# Patient Record
Sex: FEMALE | Race: ASIAN | NOT HISPANIC OR LATINO | URBAN - METROPOLITAN AREA
[De-identification: names, ages, dates, MRNs, and addresses within clinical notes are randomized per-mention and may not be internally consistent; named-entity substitution may affect disease eponyms.]

---

## 2019-11-11 ENCOUNTER — OUTPATIENT (OUTPATIENT)
Dept: OUTPATIENT SERVICES | Facility: HOSPITAL | Age: 29
LOS: 1 days | End: 2019-11-11

## 2019-11-11 VITALS
RESPIRATION RATE: 16 BRPM | HEIGHT: 61 IN | WEIGHT: 169.98 LBS | HEART RATE: 96 BPM | OXYGEN SATURATION: 98 % | SYSTOLIC BLOOD PRESSURE: 100 MMHG | DIASTOLIC BLOOD PRESSURE: 64 MMHG | TEMPERATURE: 98 F

## 2019-11-11 DIAGNOSIS — Z98.891 HISTORY OF UTERINE SCAR FROM PREVIOUS SURGERY: Chronic | ICD-10-CM

## 2019-11-11 LAB
APPEARANCE UR: CLEAR — SIGNIFICANT CHANGE UP
BILIRUB UR-MCNC: NEGATIVE — SIGNIFICANT CHANGE UP
BLD GP AB SCN SERPL QL: NEGATIVE — SIGNIFICANT CHANGE UP
BLOOD UR QL VISUAL: NEGATIVE — SIGNIFICANT CHANGE UP
COLOR SPEC: YELLOW — SIGNIFICANT CHANGE UP
GLUCOSE UR-MCNC: NEGATIVE — SIGNIFICANT CHANGE UP
HCT VFR BLD CALC: 40.4 % — SIGNIFICANT CHANGE UP (ref 34.5–45)
HGB BLD-MCNC: 13.2 G/DL — SIGNIFICANT CHANGE UP (ref 11.5–15.5)
KETONES UR-MCNC: NEGATIVE — SIGNIFICANT CHANGE UP
LEUKOCYTE ESTERASE UR-ACNC: NEGATIVE — SIGNIFICANT CHANGE UP
MCHC RBC-ENTMCNC: 28.1 PG — SIGNIFICANT CHANGE UP (ref 27–34)
MCHC RBC-ENTMCNC: 32.7 % — SIGNIFICANT CHANGE UP (ref 32–36)
MCV RBC AUTO: 86 FL — SIGNIFICANT CHANGE UP (ref 80–100)
NITRITE UR-MCNC: NEGATIVE — SIGNIFICANT CHANGE UP
NRBC # FLD: 0 K/UL — SIGNIFICANT CHANGE UP (ref 0–0)
PH UR: 6.5 — SIGNIFICANT CHANGE UP (ref 5–8)
PLATELET # BLD AUTO: 267 K/UL — SIGNIFICANT CHANGE UP (ref 150–400)
PMV BLD: 9.2 FL — SIGNIFICANT CHANGE UP (ref 7–13)
PROT UR-MCNC: NEGATIVE — SIGNIFICANT CHANGE UP
RBC # BLD: 4.7 M/UL — SIGNIFICANT CHANGE UP (ref 3.8–5.2)
RBC # FLD: 13.7 % — SIGNIFICANT CHANGE UP (ref 10.3–14.5)
RH IG SCN BLD-IMP: POSITIVE — SIGNIFICANT CHANGE UP
SP GR SPEC: 1.01 — SIGNIFICANT CHANGE UP (ref 1–1.04)
UROBILINOGEN FLD QL: NORMAL — SIGNIFICANT CHANGE UP
WBC # BLD: 11.6 K/UL — HIGH (ref 3.8–10.5)
WBC # FLD AUTO: 11.6 K/UL — HIGH (ref 3.8–10.5)

## 2019-11-11 RX ORDER — SODIUM CHLORIDE 9 MG/ML
1000 INJECTION, SOLUTION INTRAVENOUS
Refills: 0 | Status: DISCONTINUED | OUTPATIENT
Start: 2019-11-26 | End: 2019-11-26

## 2019-11-11 NOTE — OB PST NOTE - NSANTHBMIRD_ENT_A_CORE
No

## 2019-11-11 NOTE — OB PST NOTE - NSHPREVIEWOFSYSTEMS_GEN_ALL_CORE
General: No fever, chills, sweating, anorexia, . No polyphagia, polyurea, polydypsia, malaise, or fatigue    Skin: No rashes, itching, or dryness. No change in size/color of moles.     Breast: Breast tenderness in pregnancy.       Ophthalmologic: No diplopia, photophobia, lacrimation, blurred Vision , or eye discharge    ENMT Symptoms: No hearing difficulty, ear pain, tinnitus, or vertigo. No sinus symptoms, nasal congestion, nasal   discharge, or nasal obstruction    Respiratory and Thorax: No wheezing, dyspnea, cough, hemoptysis, or pleuritic chest pain     Cardiovascular: No chest pain, palpitations, dyspnea on exertion, orthopnea, paroxysmal nocturnal dyspnea,   peripheral edema, or claudication    Gastrointestinal: No nausea, vomiting, diarrhea, constipation, change in bowel habits, flatulence, abdominal pain, or melena    Genitourinary/ Pelvis: No hematuria, renal colic, or flank pain.  No urine discoloration, incontinence, dysuria, or urinary hesitancy. Normal urinary frequency. No nocturia, abnormal vaginal bleeding, vaginal discharge, spotting, pelvic pain, or vaginal leakage    Musculoskeletal: No arthralgia, arthritis, joint swelling, muscle cramping, muscle weakness, neck pain, arm pain, or leg pain    Neurological: No transient paralysis, weakness, paresthesias, or seizures. No syncope, tremors, vertigo, loss of sensation, difficulty walking, loss of consciousness, hemiparesis, confusion, or facial palsy    Psychiatric: No suicidal ideation, depression, anxiety, insomnia, memory loss, paranoia, mood swings, agitation, hallucinations, or hyperactivity    Hematology: No gum bleeding, nose bleeding, or skin lumps    Lymphatic: No enlarged or tender lymph nodes. No extremity swelling    Endocrine: No heat or cold intolerance    Immunologic: No recurrent or persistent infections

## 2019-11-11 NOTE — OB PST NOTE - NSHPPHYSICALEXAM_GEN_ALL_CORE
Constitutional: Well Developed, Well Groomed, Well Nourished, No Distress    Eyes: PERRL, EOMI, conjunctiva clear    Ears: Normal    Mouth & Gums: Normal, moist    Pharynx: No redness, discharge.    Tonsils: No Redness, discharge,  or swelling    Neck: Supple    Breast: exam declined    Back: Normal shape, ROM intact, strength intact, no vertebral tenderness    Respiratory: Airway patent, breath sounds equal, good air movement, respiration non-labored, clear to auscultation bilateral, no chest wall tenderness    Cardiovascular:  Regular rate and rhythm, no murmur, normal PMI    Gastrointestinal: Soft, non-tender, non distention, no masses palpable, bowel sound normal,  no rebound tenderness    Extremities: No clubbing, cyanosis, or pedal edema    Vascular:  Carotid Pulse normal , Radial Pulse normal,  DP pulse normal    Neurological: alert & oriented x 3, sensation intact,  normal strength    Skin: warm and dry, normal color    Lymph Nodes: normal posterior cervical lymph node, normal anterior cervical lymph node, normal supraclavicular lymph node    Musculoskeletal: ROM intact, no joint swelling, warmth, or calf tenderness. Normal strength    Psychiatric: normal affect, normal behavior

## 2019-11-11 NOTE — OB PST NOTE - PROBLEM SELECTOR PLAN 1
Pt scheduled for surgery on 11/26/19.  Pre-op instructions provided. Pt verbalized understanding.   Pt given detailed verbal and written instructions on chlorhexidine wash. Pt verbalized understanding with teachback.

## 2019-11-11 NOTE — OB PST NOTE - HISTORY OF PRESENT ILLNESS
29 year old (SUKUMAR 11/29/19 per worksheet) presents today for presurgical evaluation for ...  Pt reports good fetal movement. 29 year old pregnant female (SUKUMAR 19 per worksheet) presents today for presurgical evaluation for Repeat  Section scheduled on 19.   Pt reports good fetal movement.

## 2019-11-12 LAB — T PALLIDUM AB TITR SER: NEGATIVE — SIGNIFICANT CHANGE UP

## 2019-11-25 ENCOUNTER — TRANSCRIPTION ENCOUNTER (OUTPATIENT)
Age: 29
End: 2019-11-25

## 2019-11-26 ENCOUNTER — INPATIENT (INPATIENT)
Facility: HOSPITAL | Age: 29
LOS: 2 days | Discharge: HOME | End: 2019-11-29
Attending: OBSTETRICS & GYNECOLOGY | Admitting: OBSTETRICS & GYNECOLOGY
Payer: COMMERCIAL

## 2019-11-26 ENCOUNTER — TRANSCRIPTION ENCOUNTER (OUTPATIENT)
Age: 29
End: 2019-11-26

## 2019-11-26 VITALS — HEART RATE: 81 BPM | SYSTOLIC BLOOD PRESSURE: 115 MMHG | DIASTOLIC BLOOD PRESSURE: 74 MMHG

## 2019-11-26 DIAGNOSIS — Z98.891 HISTORY OF UTERINE SCAR FROM PREVIOUS SURGERY: ICD-10-CM

## 2019-11-26 DIAGNOSIS — Z98.890 OTHER SPECIFIED POSTPROCEDURAL STATES: Chronic | ICD-10-CM

## 2019-11-26 DIAGNOSIS — Z98.891 HISTORY OF UTERINE SCAR FROM PREVIOUS SURGERY: Chronic | ICD-10-CM

## 2019-11-26 LAB
BLD GP AB SCN SERPL QL: NEGATIVE — SIGNIFICANT CHANGE UP
HCT VFR BLD CALC: 43.7 % — SIGNIFICANT CHANGE UP (ref 34.5–45)
HGB BLD-MCNC: 14.5 G/DL — SIGNIFICANT CHANGE UP (ref 11.5–15.5)
MCHC RBC-ENTMCNC: 28.3 PG — SIGNIFICANT CHANGE UP (ref 27–34)
MCHC RBC-ENTMCNC: 33.2 % — SIGNIFICANT CHANGE UP (ref 32–36)
MCV RBC AUTO: 85.4 FL — SIGNIFICANT CHANGE UP (ref 80–100)
NRBC # FLD: 0 K/UL — SIGNIFICANT CHANGE UP (ref 0–0)
PLATELET # BLD AUTO: 238 K/UL — SIGNIFICANT CHANGE UP (ref 150–400)
PMV BLD: 9.2 FL — SIGNIFICANT CHANGE UP (ref 7–13)
RBC # BLD: 5.12 M/UL — SIGNIFICANT CHANGE UP (ref 3.8–5.2)
RBC # FLD: 13.9 % — SIGNIFICANT CHANGE UP (ref 10.3–14.5)
RH IG SCN BLD-IMP: POSITIVE — SIGNIFICANT CHANGE UP
WBC # BLD: 11.94 K/UL — HIGH (ref 3.8–10.5)
WBC # FLD AUTO: 11.94 K/UL — HIGH (ref 3.8–10.5)

## 2019-11-26 PROCEDURE — 59514 CESAREAN DELIVERY ONLY: CPT | Mod: AS,U9

## 2019-11-26 RX ORDER — METOCLOPRAMIDE HCL 10 MG
10 TABLET ORAL ONCE
Refills: 0 | Status: COMPLETED | OUTPATIENT
Start: 2019-11-26 | End: 2019-11-26

## 2019-11-26 RX ORDER — SODIUM CHLORIDE 9 MG/ML
1000 INJECTION, SOLUTION INTRAVENOUS
Refills: 0 | Status: DISCONTINUED | OUTPATIENT
Start: 2019-11-26 | End: 2019-11-26

## 2019-11-26 RX ORDER — BUTORPHANOL TARTRATE 2 MG/ML
0.12 INJECTION, SOLUTION INTRAMUSCULAR; INTRAVENOUS EVERY 6 HOURS
Refills: 0 | Status: DISCONTINUED | OUTPATIENT
Start: 2019-11-26 | End: 2019-11-26

## 2019-11-26 RX ORDER — OXYCODONE HYDROCHLORIDE 5 MG/1
10 TABLET ORAL
Refills: 0 | Status: DISCONTINUED | OUTPATIENT
Start: 2019-11-26 | End: 2019-11-26

## 2019-11-26 RX ORDER — SODIUM CHLORIDE 9 MG/ML
1000 INJECTION, SOLUTION INTRAVENOUS ONCE
Refills: 0 | Status: COMPLETED | OUTPATIENT
Start: 2019-11-26 | End: 2019-11-26

## 2019-11-26 RX ORDER — DIPHENHYDRAMINE HCL 50 MG
25 CAPSULE ORAL EVERY 6 HOURS
Refills: 0 | Status: DISCONTINUED | OUTPATIENT
Start: 2019-11-26 | End: 2019-11-29

## 2019-11-26 RX ORDER — SODIUM CHLORIDE 9 MG/ML
1000 INJECTION, SOLUTION INTRAVENOUS
Refills: 0 | Status: DISCONTINUED | OUTPATIENT
Start: 2019-11-26 | End: 2019-11-27

## 2019-11-26 RX ORDER — HYDROMORPHONE HYDROCHLORIDE 2 MG/ML
0.5 INJECTION INTRAMUSCULAR; INTRAVENOUS; SUBCUTANEOUS
Refills: 0 | Status: DISCONTINUED | OUTPATIENT
Start: 2019-11-26 | End: 2019-11-26

## 2019-11-26 RX ORDER — GLYCERIN ADULT
1 SUPPOSITORY, RECTAL RECTAL AT BEDTIME
Refills: 0 | Status: DISCONTINUED | OUTPATIENT
Start: 2019-11-26 | End: 2019-11-29

## 2019-11-26 RX ORDER — OXYCODONE HYDROCHLORIDE 5 MG/1
5 TABLET ORAL
Refills: 0 | Status: DISCONTINUED | OUTPATIENT
Start: 2019-11-26 | End: 2019-11-26

## 2019-11-26 RX ORDER — HYDROMORPHONE HYDROCHLORIDE 2 MG/ML
1 INJECTION INTRAMUSCULAR; INTRAVENOUS; SUBCUTANEOUS
Refills: 0 | Status: DISCONTINUED | OUTPATIENT
Start: 2019-11-26 | End: 2019-11-26

## 2019-11-26 RX ORDER — LANOLIN
1 OINTMENT (GRAM) TOPICAL EVERY 6 HOURS
Refills: 0 | Status: DISCONTINUED | OUTPATIENT
Start: 2019-11-26 | End: 2019-11-29

## 2019-11-26 RX ORDER — TETANUS TOXOID, REDUCED DIPHTHERIA TOXOID AND ACELLULAR PERTUSSIS VACCINE, ADSORBED 5; 2.5; 8; 8; 2.5 [IU]/.5ML; [IU]/.5ML; UG/.5ML; UG/.5ML; UG/.5ML
0.5 SUSPENSION INTRAMUSCULAR ONCE
Refills: 0 | Status: COMPLETED | OUTPATIENT
Start: 2019-11-26

## 2019-11-26 RX ORDER — INFLUENZA VIRUS VACCINE 15; 15; 15; 15 UG/.5ML; UG/.5ML; UG/.5ML; UG/.5ML
0.5 SUSPENSION INTRAMUSCULAR ONCE
Refills: 0 | Status: COMPLETED | OUTPATIENT
Start: 2019-11-26 | End: 2019-11-28

## 2019-11-26 RX ORDER — HEPARIN SODIUM 5000 [USP'U]/ML
5000 INJECTION INTRAVENOUS; SUBCUTANEOUS EVERY 12 HOURS
Refills: 0 | Status: DISCONTINUED | OUTPATIENT
Start: 2019-11-26 | End: 2019-11-29

## 2019-11-26 RX ORDER — ONDANSETRON 8 MG/1
4 TABLET, FILM COATED ORAL EVERY 6 HOURS
Refills: 0 | Status: DISCONTINUED | OUTPATIENT
Start: 2019-11-26 | End: 2019-11-26

## 2019-11-26 RX ORDER — OXYTOCIN 10 UNIT/ML
41.67 VIAL (ML) INJECTION
Qty: 20 | Refills: 0 | Status: DISCONTINUED | OUTPATIENT
Start: 2019-11-26 | End: 2019-11-26

## 2019-11-26 RX ORDER — NALOXONE HYDROCHLORIDE 4 MG/.1ML
0.1 SPRAY NASAL
Refills: 0 | Status: DISCONTINUED | OUTPATIENT
Start: 2019-11-26 | End: 2019-11-26

## 2019-11-26 RX ORDER — MAGNESIUM HYDROXIDE 400 MG/1
30 TABLET, CHEWABLE ORAL
Refills: 0 | Status: DISCONTINUED | OUTPATIENT
Start: 2019-11-26 | End: 2019-11-29

## 2019-11-26 RX ORDER — CITRIC ACID/SODIUM CITRATE 300-500 MG
30 SOLUTION, ORAL ORAL ONCE
Refills: 0 | Status: COMPLETED | OUTPATIENT
Start: 2019-11-26 | End: 2019-11-26

## 2019-11-26 RX ORDER — ACETAMINOPHEN 500 MG
975 TABLET ORAL EVERY 6 HOURS
Refills: 0 | Status: DISCONTINUED | OUTPATIENT
Start: 2019-11-26 | End: 2019-11-29

## 2019-11-26 RX ORDER — SIMETHICONE 80 MG/1
80 TABLET, CHEWABLE ORAL EVERY 4 HOURS
Refills: 0 | Status: DISCONTINUED | OUTPATIENT
Start: 2019-11-26 | End: 2019-11-29

## 2019-11-26 RX ORDER — FAMOTIDINE 10 MG/ML
20 INJECTION INTRAVENOUS ONCE
Refills: 0 | Status: COMPLETED | OUTPATIENT
Start: 2019-11-26 | End: 2019-11-26

## 2019-11-26 RX ORDER — KETOROLAC TROMETHAMINE 30 MG/ML
30 SYRINGE (ML) INJECTION EVERY 6 HOURS
Refills: 0 | Status: DISCONTINUED | OUTPATIENT
Start: 2019-11-26 | End: 2019-11-27

## 2019-11-26 RX ADMIN — Medication 30 MILLIGRAM(S): at 20:14

## 2019-11-26 RX ADMIN — HEPARIN SODIUM 5000 UNIT(S): 5000 INJECTION INTRAVENOUS; SUBCUTANEOUS at 17:40

## 2019-11-26 RX ADMIN — Medication 30 MILLIGRAM(S): at 20:40

## 2019-11-26 RX ADMIN — Medication 10 MILLIGRAM(S): at 17:40

## 2019-11-26 RX ADMIN — FAMOTIDINE 20 MILLIGRAM(S): 10 INJECTION INTRAVENOUS at 12:21

## 2019-11-26 RX ADMIN — SODIUM CHLORIDE 2000 MILLILITER(S): 9 INJECTION, SOLUTION INTRAVENOUS at 12:21

## 2019-11-26 RX ADMIN — Medication 30 MILLILITER(S): at 12:22

## 2019-11-26 RX ADMIN — Medication 10 MILLIGRAM(S): at 12:22

## 2019-11-26 RX ADMIN — SODIUM CHLORIDE 200 MILLILITER(S): 9 INJECTION, SOLUTION INTRAVENOUS at 12:22

## 2019-11-26 NOTE — OB PROVIDER DELIVERY SUMMARY - NSPROVIDERDELIVERYNOTE_OBGYN_ALL_OB_FT
An live baby girl delivered via  section.  Nuchal cord around neck & body noted.  Uterus, tubes & ovaries grossly normal.  A Hysterotomy extension @ 6 o'clock noted & repaired .  Rest of the procedure completed in usual manner .  Hemostasis assured.  Mother & baby to PACU in stable manner.  EBL-800

## 2019-11-26 NOTE — OB PROVIDER H&P - NSHPLABSRESULTS_GEN_ALL_CORE
Complete Blood Count STAT (11.26.19 @ 11:24)    WBC Count: 11.94 K/uL    Hemoglobin: 14.5 g/dL    Hematocrit: 43.7 %      Platelet Count - Automated: 238 K/uL          Type + Screen (11.11.19 @ 15:51)    ABO Interpretation: O    Rh Interpretation: Positive    Antibody Screen: Negative

## 2019-11-26 NOTE — DISCHARGE NOTE OB - CARE PROVIDER_API CALL
Aysha Bronson)  Obstetrics and Gynecology  [address] [place] [address]  Phone: [phone]  Fax: [phone]  Follow Up Time:

## 2019-11-26 NOTE — OB PROVIDER H&P - ASSESSMENT
Admit to L&D  emmanuel Cherise/MD Audie  NPO  routine labs  EFM/TOCO  anesthesia consult  Paola Bailey PAC  11-26-19 @ 12:00

## 2019-11-26 NOTE — BRIEF OPERATIVE NOTE - NSICDXBRIEFPOSTOP_GEN_ALL_CORE_FT
POST-OP DIAGNOSIS:  Nuchal cord, single gestation 2019 14:40:18  Paola Abraham  S/P  section 2019 14:40:05  Paola Abraham

## 2019-11-26 NOTE — OB PROVIDER H&P - HISTORY OF PRESENT ILLNESS
30yo  female  EDC11/ presents@ 39.4 wks for scheduled  rltcs.  AP course unremarkable.   Denies VB,ROM or UCs today.  +FM

## 2019-11-26 NOTE — DISCHARGE NOTE OB - CARE PLAN
Principal Discharge DX:	 delivery delivered  Goal:	Good recovery  Assessment and plan of treatment:	f/u 1 week

## 2019-11-26 NOTE — BRIEF OPERATIVE NOTE - OPERATION/FINDINGS
An live baby girl delivered via  section, apgar 8/9, wgt 2890gms(6.5#) @ 1312  Nuchal cord around neck & body noted.  Uterus, tubes & ovaries grossly normal.  A Hysterotomy extension @ 6 o'clock noted & repaired . Interecede placed over hysterotomy  Rest of the procedure completed in usual manner .  Hemostasis assured.  Mother & baby to PACU in stable manner.  EBL-800  QBL:300  IVF:   UOP: 325  Dictation #: [id number]

## 2019-11-26 NOTE — OB NEONATOLOGY/PEDIATRICIAN DELIVERY SUMMARY - NSPEDSNEONOTESA_OBGYN_ALL_OB_FT
39.4wk GA infant born by c/s to 30yo . mother is blood type O+, pnl neg/imm, GBS positive- AROM at delivery. infant came out with good cry and tone, brought to radiant warmer, dried and suctioned. comfortable on room air. mother wants to breast and bottle feed

## 2019-11-26 NOTE — OB PROVIDER H&P - NSHPPHYSICALEXAM_GEN_ALL_CORE
A&Ox3  Heart: RRR, S1&S2, no S3  Lungs: Clear bilateral to auscultation, good inspiratory /expiratory effort              no rhonchi, no rales  Abd: soft, Gravid, TOCO in place           +pfannenstial scar  EFM:  120 bpm/moderate variabilty/+accelerations/no decelerations/CAT 1  TOCO:  no UC  Ext:  FROM / minimal edema  Neuro: grossly intact

## 2019-11-26 NOTE — OB RN INTRAOPERATIVE NOTE - NS_DRESSINGLOCATION_OBGYN_ALL_OB_FT
How Severe Are Your Spot(S)?: moderate Have Your Spot(S) Been Treated In The Past?: has not been treated Hpi Title: Evaluation of Skin Lesions lower abdomen

## 2019-11-27 LAB
BASOPHILS # BLD AUTO: 0.04 K/UL — SIGNIFICANT CHANGE UP (ref 0–0.2)
BASOPHILS NFR BLD AUTO: 0.3 % — SIGNIFICANT CHANGE UP (ref 0–2)
EOSINOPHIL # BLD AUTO: 0.06 K/UL — SIGNIFICANT CHANGE UP (ref 0–0.5)
EOSINOPHIL NFR BLD AUTO: 0.4 % — SIGNIFICANT CHANGE UP (ref 0–6)
HCT VFR BLD CALC: 33.4 % — LOW (ref 34.5–45)
HGB BLD-MCNC: 11.2 G/DL — LOW (ref 11.5–15.5)
IMM GRANULOCYTES NFR BLD AUTO: 0.9 % — SIGNIFICANT CHANGE UP (ref 0–1.5)
LYMPHOCYTES # BLD AUTO: 14.4 % — SIGNIFICANT CHANGE UP (ref 13–44)
LYMPHOCYTES # BLD AUTO: 2.01 K/UL — SIGNIFICANT CHANGE UP (ref 1–3.3)
MCHC RBC-ENTMCNC: 28.6 PG — SIGNIFICANT CHANGE UP (ref 27–34)
MCHC RBC-ENTMCNC: 33.5 % — SIGNIFICANT CHANGE UP (ref 32–36)
MCV RBC AUTO: 85.2 FL — SIGNIFICANT CHANGE UP (ref 80–100)
MONOCYTES # BLD AUTO: 0.9 K/UL — SIGNIFICANT CHANGE UP (ref 0–0.9)
MONOCYTES NFR BLD AUTO: 6.5 % — SIGNIFICANT CHANGE UP (ref 2–14)
NEUTROPHILS # BLD AUTO: 10.81 K/UL — HIGH (ref 1.8–7.4)
NEUTROPHILS NFR BLD AUTO: 77.5 % — HIGH (ref 43–77)
NRBC # FLD: 0 K/UL — SIGNIFICANT CHANGE UP (ref 0–0)
PLATELET # BLD AUTO: 210 K/UL — SIGNIFICANT CHANGE UP (ref 150–400)
PMV BLD: 9.1 FL — SIGNIFICANT CHANGE UP (ref 7–13)
RBC # BLD: 3.92 M/UL — SIGNIFICANT CHANGE UP (ref 3.8–5.2)
RBC # FLD: 13.8 % — SIGNIFICANT CHANGE UP (ref 10.3–14.5)
WBC # BLD: 13.94 K/UL — HIGH (ref 3.8–10.5)
WBC # FLD AUTO: 13.94 K/UL — HIGH (ref 3.8–10.5)

## 2019-11-27 RX ORDER — SENNA PLUS 8.6 MG/1
2 TABLET ORAL AT BEDTIME
Refills: 0 | Status: DISCONTINUED | OUTPATIENT
Start: 2019-11-27 | End: 2019-11-29

## 2019-11-27 RX ORDER — OXYCODONE HYDROCHLORIDE 5 MG/1
5 TABLET ORAL
Refills: 0 | Status: DISCONTINUED | OUTPATIENT
Start: 2019-11-27 | End: 2019-11-29

## 2019-11-27 RX ORDER — FERROUS SULFATE 325(65) MG
325 TABLET ORAL DAILY
Refills: 0 | Status: DISCONTINUED | OUTPATIENT
Start: 2019-11-27 | End: 2019-11-29

## 2019-11-27 RX ORDER — IBUPROFEN 200 MG
600 TABLET ORAL EVERY 6 HOURS
Refills: 0 | Status: DISCONTINUED | OUTPATIENT
Start: 2019-11-27 | End: 2019-11-29

## 2019-11-27 RX ORDER — IBUPROFEN 200 MG
600 TABLET ORAL EVERY 6 HOURS
Refills: 0 | Status: COMPLETED | OUTPATIENT
Start: 2019-11-27 | End: 2020-10-25

## 2019-11-27 RX ORDER — OXYCODONE HYDROCHLORIDE 5 MG/1
5 TABLET ORAL ONCE
Refills: 0 | Status: DISCONTINUED | OUTPATIENT
Start: 2019-11-27 | End: 2019-11-29

## 2019-11-27 RX ADMIN — Medication 600 MILLIGRAM(S): at 15:01

## 2019-11-27 RX ADMIN — Medication 30 MILLIGRAM(S): at 03:14

## 2019-11-27 RX ADMIN — Medication 975 MILLIGRAM(S): at 20:06

## 2019-11-27 RX ADMIN — Medication 600 MILLIGRAM(S): at 22:49

## 2019-11-27 RX ADMIN — Medication 600 MILLIGRAM(S): at 15:30

## 2019-11-27 RX ADMIN — HEPARIN SODIUM 5000 UNIT(S): 5000 INJECTION INTRAVENOUS; SUBCUTANEOUS at 18:12

## 2019-11-27 RX ADMIN — Medication 30 MILLIGRAM(S): at 03:50

## 2019-11-27 RX ADMIN — HEPARIN SODIUM 5000 UNIT(S): 5000 INJECTION INTRAVENOUS; SUBCUTANEOUS at 06:11

## 2019-11-27 RX ADMIN — Medication 975 MILLIGRAM(S): at 20:42

## 2019-11-27 RX ADMIN — Medication 600 MILLIGRAM(S): at 22:11

## 2019-11-27 NOTE — PROGRESS NOTE ADULT - SUBJECTIVE AND OBJECTIVE BOX
Postpartum Note,  Section  She is a  29y woman who is now post-operative day:  1  Passed flatus      Subjective:  The patient feels well.  She is ambulating.   She is tolerating regular diet.  She denies nausea and vomiting.  She is voiding.  Her pain is controlled.  She reports normal postpartum bleeding.    Vital Signs Last 24 Hrs  T(C): 36.7 (2019 06:00), Max: 36.7 (2019 14:11)  T(F): 98.1 (2019 06:00), Max: 98.1 (2019 14:11)  HR: 81 (2019 06:00) (68 - 86)  BP: 118/73 (2019 06:00) (96/52 - 118/73)  BP(mean): 76 (2019 16:30) (56 - 76)  RR: 16 (2019 06:00) (14 - 19)  SpO2: 100% (2019 06:00) (93% - 100%)    Physical exam:  Gen: NAD  Breast: Soft, nontender, not engorged.  Abdomen: Soft, nontender, no distension , firm uterine fundus at umbilicus.  Incision: Clean, dry, and intact with steri strips  Pelvic: Normal lochia noted  Ext: No calf tenderness    LABS:                        11.2   13.94 )-----------( 210      ( 2019 05:10 )             33.4     ABO Interpretation: O ( @ 11:34)  Rh Interpretation: Positive ( @ 11:34)  Antibody Screen: Negative ( @ 11:34)      Allergies    No Known Allergies    Intolerances      MEDICATIONS  (STANDING):  acetaminophen   Tablet .. 975 milliGRAM(s) Oral every 6 hours  diphtheria/tetanus/pertussis (acellular) Vaccine (ADAcel) 0.5 milliLiter(s) IntraMuscular once  ferrous    sulfate 325 milliGRAM(s) Oral daily  heparin  Injectable 5000 Unit(s) SubCutaneous every 12 hours  ibuprofen  Tablet. 600 milliGRAM(s) Oral every 6 hours  influenza   Vaccine 0.5 milliLiter(s) IntraMuscular once  ketorolac   Injectable 30 milliGRAM(s) IV Push every 6 hours  prenatal multivitamin 1 Tablet(s) Oral daily    MEDICATIONS  (PRN):  diphenhydrAMINE 25 milliGRAM(s) Oral every 6 hours PRN Itching  glycerin Suppository - Adult 1 Suppository(s) Rectal at bedtime PRN Constipation  lanolin Ointment 1 Application(s) Topical every 6 hours PRN Sore Nipples  magnesium hydroxide Suspension 30 milliLiter(s) Oral two times a day PRN Constipation  oxyCODONE    IR 5 milliGRAM(s) Oral every 3 hours PRN Moderate to Severe Pain (4-10)  oxyCODONE    IR 5 milliGRAM(s) Oral once PRN Moderate to Severe Pain (4-10)  senna 2 Tablet(s) Oral at bedtime PRN Constipation  simethicone 80 milliGRAM(s) Chew every 4 hours PRN Gas        Assessment and Plan  POD # 1 s/p  section  Doing well.  Encourage ambulation.

## 2019-11-27 NOTE — PROGRESS NOTE ADULT - SUBJECTIVE AND OBJECTIVE BOX
Pain Service Follow-up  Postop Day  1    S/P  C- Section    T(C): 36.7 (11-27-19 @ 06:00), Max: 36.7 (11-26-19 @ 14:11)  HR: 81 (11-27-19 @ 06:00) (68 - 86)  BP: 118/73 (11-27-19 @ 06:00) (96/52 - 118/73)  RR: 16 (11-27-19 @ 06:00) (14 - 19)  SpO2: 100% (11-27-19 @ 06:00) (93% - 100%)  Wt(kg): --      THERAPY:  Spinal Morphine     Sedation Score:	  [X] Alert	      [  ] Drowsy       [  ] Arousable	[  ] Asleep         [  ] Unresponsive    Side Effects:	  [X] None	      [  ] Nausea       [  ] Pruritus        [  ] Weakness   [  ] Numbness        ASSESSMENT/ PLAN   [ X ] Discontinue         [  ] Continue    [ X ] Documentation and Verification of current medications       Satisfactory Post Anesthetic Course

## 2019-11-28 RX ADMIN — Medication 325 MILLIGRAM(S): at 13:27

## 2019-11-28 RX ADMIN — HEPARIN SODIUM 5000 UNIT(S): 5000 INJECTION INTRAVENOUS; SUBCUTANEOUS at 17:38

## 2019-11-28 RX ADMIN — Medication 975 MILLIGRAM(S): at 22:48

## 2019-11-28 RX ADMIN — Medication 600 MILLIGRAM(S): at 21:44

## 2019-11-28 RX ADMIN — Medication 600 MILLIGRAM(S): at 14:20

## 2019-11-28 RX ADMIN — HEPARIN SODIUM 5000 UNIT(S): 5000 INJECTION INTRAVENOUS; SUBCUTANEOUS at 06:01

## 2019-11-28 RX ADMIN — Medication 975 MILLIGRAM(S): at 16:30

## 2019-11-28 RX ADMIN — SIMETHICONE 80 MILLIGRAM(S): 80 TABLET, CHEWABLE ORAL at 09:05

## 2019-11-28 RX ADMIN — Medication 600 MILLIGRAM(S): at 13:27

## 2019-11-28 RX ADMIN — INFLUENZA VIRUS VACCINE 0.5 MILLILITER(S): 15; 15; 15; 15 SUSPENSION INTRAMUSCULAR at 16:20

## 2019-11-28 RX ADMIN — Medication 975 MILLIGRAM(S): at 10:00

## 2019-11-28 RX ADMIN — Medication 975 MILLIGRAM(S): at 17:20

## 2019-11-28 RX ADMIN — Medication 975 MILLIGRAM(S): at 21:48

## 2019-11-28 RX ADMIN — Medication 600 MILLIGRAM(S): at 20:44

## 2019-11-28 RX ADMIN — Medication 600 MILLIGRAM(S): at 06:01

## 2019-11-28 RX ADMIN — MAGNESIUM HYDROXIDE 30 MILLILITER(S): 400 TABLET, CHEWABLE ORAL at 16:29

## 2019-11-28 RX ADMIN — Medication 600 MILLIGRAM(S): at 06:51

## 2019-11-28 RX ADMIN — Medication 975 MILLIGRAM(S): at 09:05

## 2019-11-28 RX ADMIN — Medication 1 TABLET(S): at 13:27

## 2019-11-28 NOTE — PROGRESS NOTE ADULT - SUBJECTIVE AND OBJECTIVE BOX
Patient assessed at 0736.Subjective  Pain: Patient reports increase pain that is being managed well by pain management protocol.  Complaints: None. Patient denies any headache, blur vision, dizziness and/or weakness/fatigue  Milestones:  Alert and orientedx3. Out of bed ambulating. Positive flatus. Negative bowel movement, denies the urge. Voiding.  Tolerating a regular diet.  Infant feeding: breastfeeding  Feeding related issues and/or concerns: none    OBJECTIVE:  T(C): 36.8 (19 @ 05:09), Max: 37 (19 @ 17:26)  HR: 91 (19 @ 05:09) (91 - 100)  BP: 100/62 (19 @ 05:09) (100/62 - 116/66)  RR: 18 (19 @ 05:09) (18 - 18)  SpO2: 97% (19 @ 05:09) (97% - 100%)  Wt(kg): --                        11.2   13.94 )-----------( 210      ( 2019 05:10 )             33.4           Blood Type: O Positive    RPR: Negative    Rubella Immune          MEDICATIONS  (STANDING):  acetaminophen   Tablet .. 975 milliGRAM(s) Oral every 6 hours  diphtheria/tetanus/pertussis (acellular) Vaccine (ADAcel) 0.5 milliLiter(s) IntraMuscular once  ferrous    sulfate 325 milliGRAM(s) Oral daily  heparin  Injectable 5000 Unit(s) SubCutaneous every 12 hours  ibuprofen  Tablet. 600 milliGRAM(s) Oral every 6 hours  influenza   Vaccine 0.5 milliLiter(s) IntraMuscular once  prenatal multivitamin 1 Tablet(s) Oral daily    MEDICATIONS  (PRN):  diphenhydrAMINE 25 milliGRAM(s) Oral every 6 hours PRN Itching  glycerin Suppository - Adult 1 Suppository(s) Rectal at bedtime PRN Constipation  lanolin Ointment 1 Application(s) Topical every 6 hours PRN Sore Nipples  magnesium hydroxide Suspension 30 milliLiter(s) Oral two times a day PRN Constipation  oxyCODONE    IR 5 milliGRAM(s) Oral every 3 hours PRN Moderate to Severe Pain (4-10)  oxyCODONE    IR 5 milliGRAM(s) Oral once PRN Moderate to Severe Pain (4-10)  senna 2 Tablet(s) Oral at bedtime PRN Constipation  simethicone 80 milliGRAM(s) Chew every 4 hours PRN Gas        ASSESSMENT:  30y/o     Day#2   repeat post-operative  section delivery   Condition: Stable  Past Medical/Surgical/OB/GYN History significant for:  section 2016, SAB with D&C   Current Issues: EBL 300cc  Lung sounds clear bilaterally.  Breasts: soft, nontender  Nipples: intact  Abdomen: Soft, nondistended and nontender. Bowel sounds present. Fundus firm  Abdominal incision: Clean, dry and intact with steri strips.   Vaginal: Lochia light rubra  Extremities: Slight edema noted bilaterally and equal to lower extremities, nontender with no erythremic areas noted. Positive pedal pulses. No palpable veins noted  Other relevant physical exam findings: none    Plan  Continue routine post-operative and postpartum care  Increase ambulation, analgesia PRN and pain medication protocol of standing ibuprofen and acetaminophen and oxycodone prn  Encouraged to wear abdominal binder for support and to use incentive spirometer  Discussed breast/nipple care and breastfeeding.

## 2019-11-29 VITALS
SYSTOLIC BLOOD PRESSURE: 106 MMHG | TEMPERATURE: 98 F | RESPIRATION RATE: 18 BRPM | DIASTOLIC BLOOD PRESSURE: 67 MMHG | HEART RATE: 88 BPM | OXYGEN SATURATION: 100 %

## 2019-11-29 RX ORDER — TETANUS TOXOID, REDUCED DIPHTHERIA TOXOID AND ACELLULAR PERTUSSIS VACCINE, ADSORBED 5; 2.5; 8; 8; 2.5 [IU]/.5ML; [IU]/.5ML; UG/.5ML; UG/.5ML; UG/.5ML
0.5 SUSPENSION INTRAMUSCULAR ONCE
Refills: 0 | Status: COMPLETED | OUTPATIENT
Start: 2019-11-29 | End: 2019-11-29

## 2019-11-29 RX ADMIN — Medication 600 MILLIGRAM(S): at 10:55

## 2019-11-29 RX ADMIN — HEPARIN SODIUM 5000 UNIT(S): 5000 INJECTION INTRAVENOUS; SUBCUTANEOUS at 05:08

## 2019-11-29 RX ADMIN — Medication 600 MILLIGRAM(S): at 03:54

## 2019-11-29 RX ADMIN — TETANUS TOXOID, REDUCED DIPHTHERIA TOXOID AND ACELLULAR PERTUSSIS VACCINE, ADSORBED 0.5 MILLILITER(S): 5; 2.5; 8; 8; 2.5 SUSPENSION INTRAMUSCULAR at 05:31

## 2019-11-29 RX ADMIN — Medication 600 MILLIGRAM(S): at 11:54

## 2019-11-29 RX ADMIN — Medication 975 MILLIGRAM(S): at 06:00

## 2019-11-29 RX ADMIN — Medication 975 MILLIGRAM(S): at 05:08

## 2019-11-29 RX ADMIN — Medication 1 TABLET(S): at 10:55

## 2019-11-29 RX ADMIN — Medication 325 MILLIGRAM(S): at 10:55

## 2019-11-29 RX ADMIN — Medication 600 MILLIGRAM(S): at 04:54

## 2019-11-29 NOTE — PROGRESS NOTE ADULT - SUBJECTIVE AND OBJECTIVE BOX
Postpartum Note,  Section  She is a  29y woman who is now post-operative day:  3    Subjective:  The patient feels well.  She is ambulating.   She is tolerating regular diet.  She denies nausea and vomiting.  She is voiding.  Her pain is controlled.  She reports normal postpartum bleeding.    Vital Signs Last 24 Hrs  T(C): 36.6 (2019 22:05), Max: 36.8 (2019 05:09)  T(F): 97.9 (2019 22:05), Max: 98.2 (2019 05:09)  HR: 78 (2019 22:05) (78 - 91)  BP: 110/70 (2019 22:05) (100/62 - 121/73)  BP(mean): --  RR: 18 (2019 22:05) (18 - 18)  SpO2: 100% (2019 22:05) (97% - 100%)    Physical exam:  Gen: NAD  Breast: Soft, nontender, not engorged.  Abdomen: Soft, nontender, no distension , firm uterine fundus at umbilicus.  Incision: Clean, dry, and intact with steri strips  Pelvic: Normal lochia noted  Ext: No calf tenderness    LABS:                        11.2   13.94 )-----------( 210      ( 2019 05:10 )             33.4         Allergies    No Known Allergies    Intolerances      MEDICATIONS  (STANDING):  acetaminophen   Tablet .. 975 milliGRAM(s) Oral every 6 hours  diphtheria/tetanus/pertussis (acellular) Vaccine (ADAcel) 0.5 milliLiter(s) IntraMuscular once  ferrous    sulfate 325 milliGRAM(s) Oral daily  heparin  Injectable 5000 Unit(s) SubCutaneous every 12 hours  ibuprofen  Tablet. 600 milliGRAM(s) Oral every 6 hours  prenatal multivitamin 1 Tablet(s) Oral daily    MEDICATIONS  (PRN):  diphenhydrAMINE 25 milliGRAM(s) Oral every 6 hours PRN Itching  glycerin Suppository - Adult 1 Suppository(s) Rectal at bedtime PRN Constipation  lanolin Ointment 1 Application(s) Topical every 6 hours PRN Sore Nipples  magnesium hydroxide Suspension 30 milliLiter(s) Oral two times a day PRN Constipation  oxyCODONE    IR 5 milliGRAM(s) Oral every 3 hours PRN Moderate to Severe Pain (4-10)  oxyCODONE    IR 5 milliGRAM(s) Oral once PRN Moderate to Severe Pain (4-10)  senna 2 Tablet(s) Oral at bedtime PRN Constipation  simethicone 80 milliGRAM(s) Chew every 4 hours PRN Gas        Assessment and Plan  POD # 3 s/p  section  Doing well.  Encourage ambulation.  Discharge home today

## 2019-12-02 NOTE — DISCHARGE NOTE OB - PERSISTENT HEADACHE, BLURRED VISION

## 2022-03-18 PROBLEM — Z78.9 OTHER SPECIFIED HEALTH STATUS: Chronic | Status: ACTIVE | Noted: 2019-11-11

## 2022-03-21 DIAGNOSIS — Z78.9 OTHER SPECIFIED HEALTH STATUS: ICD-10-CM

## 2022-03-21 DIAGNOSIS — N92.5 OTHER SPECIFIED IRREGULAR MENSTRUATION: ICD-10-CM

## 2022-03-21 RX ORDER — LEVONORGESTREL AND ETHINYL ESTRADIOL 0.1-0.02MG
0.1-2 KIT ORAL
Refills: 0 | Status: ACTIVE | COMMUNITY

## 2022-03-30 ENCOUNTER — LABORATORY RESULT (OUTPATIENT)
Age: 32
End: 2022-03-30

## 2022-03-30 ENCOUNTER — APPOINTMENT (OUTPATIENT)
Dept: OBGYN | Facility: CLINIC | Age: 32
End: 2022-03-30
Payer: COMMERCIAL

## 2022-03-30 VITALS
DIASTOLIC BLOOD PRESSURE: 70 MMHG | HEART RATE: 75 BPM | BODY MASS INDEX: 53.92 KG/M2 | WEIGHT: 293 LBS | TEMPERATURE: 97.9 F | RESPIRATION RATE: 16 BRPM | SYSTOLIC BLOOD PRESSURE: 98 MMHG | OXYGEN SATURATION: 96 % | HEIGHT: 62 IN

## 2022-03-30 DIAGNOSIS — Z30.019 ENCOUNTER FOR INITIAL PRESCRIPTION OF CONTRACEPTIVES, UNSPECIFIED: ICD-10-CM

## 2022-03-30 DIAGNOSIS — Z00.00 ENCOUNTER FOR GENERAL ADULT MEDICAL EXAMINATION W/OUT ABNORMAL FINDINGS: ICD-10-CM

## 2022-03-30 PROCEDURE — 99214 OFFICE O/P EST MOD 30 MIN: CPT

## 2022-03-30 PROCEDURE — 36415 COLL VENOUS BLD VENIPUNCTURE: CPT

## 2022-03-30 RX ORDER — NORETHINDRONE ACETATE AND ETHINYL ESTRADIOL AND FERROUS FUMARATE 1.5-30(21)
1.5-3 KIT ORAL DAILY
Qty: 3 | Refills: 3 | Status: ACTIVE | COMMUNITY
Start: 2022-03-30 | End: 1900-01-01

## 2022-03-30 NOTE — HISTORY OF PRESENT ILLNESS
[FreeTextEntry1] : 31 year - 2 C/S\par LMP: 2022  - Menses-Regular\par In for Results & OCP's.\par PAP-WNR, NG & CT-Negative. \par Medication- Balcoltra \par Urine- UCG @ home- Negative. \par Started OCP's in the middle of month without menses. \par To stop OCP's now.\par To await for menses to start OCP's\par OCP counseling done. \par Non smoker. \par

## 2022-04-01 ENCOUNTER — APPOINTMENT (OUTPATIENT)
Dept: OBGYN | Facility: CLINIC | Age: 32
End: 2022-04-01
Payer: COMMERCIAL

## 2022-04-01 VITALS
TEMPERATURE: 97.6 F | WEIGHT: 144 LBS | HEIGHT: 62 IN | HEART RATE: 81 BPM | BODY MASS INDEX: 26.5 KG/M2 | OXYGEN SATURATION: 99 % | SYSTOLIC BLOOD PRESSURE: 108 MMHG | RESPIRATION RATE: 16 BRPM | DIASTOLIC BLOOD PRESSURE: 74 MMHG

## 2022-04-01 DIAGNOSIS — N92.6 IRREGULAR MENSTRUATION, UNSPECIFIED: ICD-10-CM

## 2022-04-01 DIAGNOSIS — N91.1 SECONDARY AMENORRHEA: ICD-10-CM

## 2022-04-01 PROCEDURE — 36415 COLL VENOUS BLD VENIPUNCTURE: CPT

## 2022-04-01 PROCEDURE — 99213 OFFICE O/P EST LOW 20 MIN: CPT

## 2022-04-01 NOTE — HISTORY OF PRESENT ILLNESS
[FreeTextEntry1] : 31 year  C/S-2\par LMP:2022   - Irregular Menses\par No Medication\par HCG on 22- 617. \par Patient was on OCP'- sTOPPED ON 22. \par Not a desired pregnancy. \par Will be going for TOP. \par Will repeat HCG today.

## 2022-04-04 ENCOUNTER — TRANSCRIPTION ENCOUNTER (OUTPATIENT)
Age: 32
End: 2022-04-04

## 2022-04-04 LAB
HCG SERPL-MCNC: 1238 MIU/ML
PROGEST SERPL-MCNC: 46.2 NG/ML
TSH SERPL-ACNC: 1.92 UIU/ML

## 2022-06-13 NOTE — DISCHARGE NOTE OB - PATIENT PORTAL LINK FT
[Initial Consultation] : an initial consultation for [Formal Caregiver] : formal caregiver [FreeTextEntry2] : hearing loss  [Interpreters_IDNumber] : [id number] [Interpreters_FullName] : Evelyn [FreeTextEntry3] : Mandarin  You can access the FollowMyHealth Patient Portal offered by [facility] by registering at the following website: [email or web address]. By joining [employer]’s FollowMyHealth portal, you will also be able to view your health information using other applications (apps) compatible with our system.

## 2023-01-19 ENCOUNTER — RX RENEWAL (OUTPATIENT)
Age: 33
End: 2023-01-19

## 2023-01-25 NOTE — OB RN INTRAOPERATIVE NOTE - NS_URGENCYSURGERY_OBGYN_ALL_OB
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  In April 2020 she developed confirmed COVID infection. About 4 weeks later her neurologic symptoms began. Her first symptom was tingling in her hands followed within days burning in her hands.  It then quickly progressed to involving bilateral legs below the knee and then her feet. She felt weak in her hands and feet. Fine motor tasks and gait became impaired. She has trouble picking up objects because she has to watch herself  objects. She needed a walker. She also felt that her speech became periodically slurred. The sensory symptoms that included pain and allodynia were most problematic in her hands and feet. NCS in 7/20 showed absent sensory responses in the upper and lower limbs and normal motor responses. In 8/20 IVIG 2 gm/kg loading and then 1 gm/kg q 3 week maintenance was started. Through the fall 2020 she made slow improvements, particular in function and gait. By 11/20 she felt \"50%\" better. In 1/21 she reduced IVIG from 1 gm/kg q 3 weeks to q 4 week. In 2021 her neuropathy was stable but she developed thoracic compression fractures, and gait became limited by pain. In the spring and summer 2021 she reported worsening sensation and gait. Outcomes 6/2/21 showed a marked drop off in  strength and I-RODS. In 6/21 we increased IVIG to 1 gm/kg q 3 weeks. She stabilized through the end of 2021. In mid 2022 we reduced IVIG back to 1 gm/kg q 4 weeks.     Interval History:   I last saw her 5/9/22. She continues IVIG once per month 1 gm/kg. She was recently discharged from [facility]. She had a prolonged admission for sepsis and pneumonia. She was also followed by psychiatry for encephalopathy. As far as her neuropathy, she continues to have numbness below her elbows and ankles. She is in a wheelchair now, but when she is at her home she walks with a " Scheduled walker and occasional takes steps unassisted. With her hands she struggles with things like dressing and doing day to day tasks that require fine motor skills. There are no definite IVIG treatment related fluctuations, although sometimes reports an increase in the sensory symptoms as IVIG approaches. This is not consistent.      Prior pertinent laboratory work-up:  5/2020:  ANH 1:160. Vitamin B1 low (45). B12 low/normal (285). Negative/normal double stranded DNA, ANCA, C-reactive, RF, GM1, GD1, Gq1b, vitamin A, B6, Vit E, SSA, SSB undetectable.  6/2020 CSF: 0 WBC, 0 RBC, protein 58 glucose 29.  7/2020: Normal Vitamin B1, B12, folate    8/2020: TS-H[place] elevated [place] [address] (N<[id number]). Negative FGFR3, Immunofixation, paraneoplastic panel,GD1a.  12/20: B1 low (66)  1/2021:  B1 and B6 slightly elevated (B1 = 193, B6 = 182). Normal B12  3/21: Hba1c 5.0  6/21: Normal B6, SSa, SSb, serum IF (no monoclonal protein)    Prior electrophysiologic work-up:  7/23/20 NCS/EMG showed all absent upper and lower limb SNAPS and all normal upper and lower limb motor responses.   8/3/20 NCS/EMG showed absent right  median, ulnar, and sural sensory studies with radial having attenuated amplitude.   1/13/21: NCS still showed a non length-dependent sensory neuropathy or ganglionopathy, but compared to prior studies performed 8/3/20 and 7/23/20 there has been unequivocal interval improvement in sensory response amplitudes in the lower > upper limbs.      Prior pertinent imaging work-up:  5/20: MRI brain with and without contrast was essentially normal  5/20: MRI C spine with and without contrast showed no abnormal enhancement in the spinal cord, thecal sac or cervical vertebrae. No spinal canal or neural foraminal narrowing.  4/21: MRI T and LS spine showed acute compression fractures of T6, T10 and T9 vertebrae, which are new compared to 11/5/2020.     Past Medical History:   Past Medical History:   Diagnosis Date     Acute kidney injury  (H) 2019     Acute massive pulmonary embolism (H) 2019     Acute pancreatitis 2018     Acute pancreatitis 2021    due to ETOH     Acute thoracic back pain 2021     ARAMIS (acute kidney injury) (H) 2019     Cardiac arrest, cause unspecified (H) 2019    massive pulmonary embolism with pulseless electrical activity cardiac arrest in May 2019 following gastric bypass in 2019      Depression with anxiety      GERD (gastroesophageal reflux disease)      History of alcohol use disorder      HTN (hypertension)      Infection due to 2019 novel coronavirus 2020    COVID19 infection in 2020     Ischemic colitis (H) 2019     Morbid obesity (H)      Scalp laceration, initial encounter 2020     Past Surgical History:  Past Surgical History:   Procedure Laterality Date      SECTION       COLONOSCOPY N/A 2022    Procedure: COLONOSCOPY;  Surgeon: Chandrakant Toledo MD;  Location:  GI     ESOPHAGOSCOPY, GASTROSCOPY, DUODENOSCOPY (EGD), COMBINED N/A 2022    Procedure: ESOPHAGOGASTRODUODENOSCOPY, WITH BIOPSY;  Surgeon: Chandrakant Toledo MD;  Location:  GI     EXAM UNDER ANESTHESIA ANUS N/A 8/3/2022    Procedure: EXAM UNDER ANESTHESIA, ANUS;  Surgeon: Chip Way MD;  Location: [facility] OR     HEMORRHOIDECTOMY EXTERNAL N/A 8/3/2022    Procedure: HEMORRHOIDECTOMY, EXTERNAL;  Surgeon: Chip Way MD;  Location: [facility] OR     LAPAROSCOPIC GASTRIC SLEEVE N/A 2019    Procedure: Laparoscopic Sleeve Gastrectomy;  Surgeon: Luan Lopez MD;  Location:  OR     ORTHOPEDIC SURGERY      2 knee meniscus surgery     Family history:    There is no known family history of hereditary neuropathies or other neuromuscular disorders.     Social History:    Social History     Tobacco Use     Smoking status: Every Day     Packs/day: 0.25     Years: 1.00     Pack years: 0.25     Types: Cigarettes, Vaping Device     Start date: 2016      Smokeless tobacco: Never     Tobacco comments:     Vaping daily    Vaping Use     Vaping Use: Never used   Substance Use Topics     Alcohol use: Not Currently     Comment: Quit drinking when last hospitalized     Drug use: Not Currently     Types: Marijuana      Medical Allergies:   No Known Allergies     Current Medications:    Current Outpatient Medications:      acetaminophen (TYLENOL) 500 MG tablet, Take 500-1,000 mg by mouth every 6 hours as needed, Disp: , Rfl:      ammonium lactate (AMLACTIN) 12 % external cream, APPLY TOPICALLY TO THE AFFECTED AREA TWICE DAILY, Disp: 385 g, Rfl: 0     apixaban ANTICOAGULANT (ELIQUIS) 5 MG tablet, Take 1 tablet (5 mg) by mouth 2 times daily, Disp: 60 tablet, Rfl: 3     ARIPiprazole (ABILIFY) 10 MG tablet, Take 1 tablet by mouth See Admin Instructions, Disp: , Rfl:      Biotin 5000 MCG TABS, Take 1 tablet by mouth daily, Disp: , Rfl:      blood glucose (NO BRAND SPECIFIED) lancets standard, Use to test blood sugar 2 times daily or as directed., Disp: 100 lancet, Rfl: 3     blood glucose (NO BRAND SPECIFIED) test strip, Use to test blood sugar 1 times daily or as directed. To accompany: Blood Glucose Monitor Brands: per insurance., Disp: 100 strip, Rfl: 11     blood glucose calibration (NO BRAND SPECIFIED) solution, To accompany: Blood Glucose Monitor Brands: per insurance., Disp: 1 each, Rfl: 0     blood glucose monitoring (NO BRAND SPECIFIED) meter device kit, Use to test blood sugar 1 times daily or as directed. Preferred blood glucose meter OR supplies to accompany: Blood Glucose Monitor Brands: per insurance., Disp: 1 kit, Rfl: 0     calcium Citrate-vitamin D 500-400 MG-UNIT CHEW, Take 1 chew tab by mouth 3 times daily, Disp: 90 tablet, Rfl: 11     cholecalciferol 50 MCG (2000 UT) tablet, Take 2,000 Units by mouth daily, Disp: , Rfl:      diphenhydrAMINE (BENADRYL) 50 MG capsule, Take 50 mg by mouth every 6 hours as needed for allergies or other (prior to infusion), Disp:  , Rfl:      EPINEPHrine (ANY BX GENERIC EQUIV) 0.3 MG/0.3ML injection 2-pack, , Disp: , Rfl:      erythromycin (ROMYCIN) 5 MG/GM ophthalmic ointment, Place 0.5 inches into both eyes 2 times daily, Disp: 3.5 g, Rfl: 1     ferrous sulfate (FEROSUL) 325 (65 Fe) MG tablet, Take 325 mg by mouth every 48 hours, Disp: , Rfl:      fluocinonide (LIDEX) 0.05 % external cream, Apply topically 2 times daily To hands/feet if rash not resolved with ketoconazole cream, Disp: 60 g, Rfl: 0     folic acid (FOLVITE) 1 MG tablet, Take 1 tablet (1 mg) by mouth daily, Disp: 90 tablet, Rfl: 3     furosemide (LASIX) 40 MG tablet, Take 1 tablet by mouth See Admin Instructions, Disp: , Rfl:      hydrOXYzine (ATARAX) 25 MG tablet, Take 1-2 tablets (25-50 mg) by mouth every 6 hours as needed for anxiety, Disp: 180 tablet, Rfl: 1     hydrOXYzine (VISTARIL) 25 MG capsule, Take 25 mg by mouth every 6 hours as needed, Disp: , Rfl:      ketoconazole (NIZORAL) 2 % external cream, Apply topically daily To hands/feet, Disp: 60 g, Rfl: 11     Lidocaine (LIDOCARE) 4 % Patch, Place 1 patch onto the skin See Admin Instructions, Disp: , Rfl:      lipase-protease-amylase (CREON 12) 72340-82739-52442 units CPEP, Take 1 capsule by mouth 4 times daily, Disp: , Rfl:      medroxyPROGESTERone (DEPO-PROVERA) 150 MG/ML IM injection, Inject 150 mg into the muscle every 3 months, Disp: , Rfl:      metFORMIN (GLUCOPHAGE XR) 500 MG 24 hr tablet, Take 1 tablet (500 mg) by mouth daily (with dinner), Disp: 90 tablet, Rfl: 1     Multiple Vitamin (ONE-A-DAY ESSENTIAL) TABS, Take 1 tablet by mouth daily, Disp: , Rfl:      Multiple Vitamins-Minerals (MULTIVITAMIN ADULT) CHEW, Take 1 chew tab by mouth 2 times daily, Disp: 60 tablet, Rfl: 11     OLANZapine (ZYPREXA) 10 MG tablet, TAKE 1 TABLET(10 MG) BY MOUTH AT BEDTIME, Disp: 30 tablet, Rfl: 3     OLANZapine (ZYPREXA) 2.5 MG tablet, Take 1 tablet (2.5 mg) by mouth 2 times daily as needed (Anxiety, agitation or psychosis),  Disp: 60 tablet, Rfl: 1     omeprazole (PRILOSEC) 20 MG DR capsule, Take 1 capsule (20 mg) by mouth 2 times daily, Disp: 180 capsule, Rfl: 3     ondansetron (ZOFRAN ODT) 4 MG ODT tab, Place 4 mg under the tongue every 8 hours as needed, Disp: , Rfl:      ondansetron (ZOFRAN-ODT) 4 MG ODT tab, DISSOLVE 1 TABLET(4 MG) ON THE TONGUE EVERY 8 HOURS AS NEEDED FOR NAUSEA, Disp: 30 tablet, Rfl: 1     polyethylene glycol (MIRALAX) 17 GM/Dose powder, Take 17 g by mouth daily, Disp: , Rfl:      polyethylene glycol (MIRALAX) 17 GM/SCOOP powder, Take 17 g (1 capful) by mouth daily, Disp: 850 g, Rfl: 3     potassium chloride ER (K-TAB/KLOR-CON) 10 MEQ CR tablet, TAKE 2 TABLETS(20 MEQ) BY MOUTH TWICE DAILY, Disp: 120 tablet, Rfl: 0     potassium chloride ER (KLOR-CON M) 10 MEQ CR tablet, Take 1 tablet by mouth See Admin Instructions, Disp: , Rfl:      prazosin (MINIPRESS) 1 MG capsule, TAKE 2 CAPSULES(2 MG) BY MOUTH AT BEDTIME, Disp: 60 capsule, Rfl: 2     Pregabalin (LYRICA) 200 MG capsule, Take 200 mg by mouth, Disp: , Rfl:      Pregabalin (LYRICA) 200 MG capsule, Take 1 capsule (200 mg) by mouth 3 times daily, Disp: 270 capsule, Rfl: 0     PRIVIGEN 20 GM/200ML SOLN, Inject 20 g into the vein every 28 days For total dose of 60 g, Disp: , Rfl:      PRIVIGEN 40 GM/400ML SOLN, Inject 40 g into the vein every 28 days For total dose of 60 g, Disp: , Rfl:      senna-docusate (SENOKOT-S/PERICOLACE) 8.6-50 MG tablet, Take 1 tablet by mouth, Disp: , Rfl:      SOLU-CORTEF 100 MG injection, Inject 100 mg into the muscle every 30 days, Disp: , Rfl:      sulfamethoxazole-trimethoprim (BACTRIM DS) 800-160 MG tablet, Take 1 tablet by mouth 2 times daily, Disp: 6 tablet, Rfl: 0     thiamine (B-1) 100 MG tablet, Take 1 tablet by mouth See Admin Instructions, Disp: , Rfl:      thin (NO BRAND SPECIFIED) lancets, Use with lanceting device. To accompany: Blood Glucose Monitor Brands: per insurance., Disp: 90 each, Rfl: 11     thin (NO BRAND  SPECIFIED) lancets, Use with lanceting device. To accompany: Blood Glucose Monitor Brands: per insurance., Disp: 100 each, Rfl: 11     venlafaxine (EFFEXOR XR) 150 MG 24 hr capsule, Take 1 capsule (150 mg) by mouth daily, Disp: 90 capsule, Rfl: 1     vitamin B-Complex, Take 1 tablet by mouth daily, Disp: 90 tablet, Rfl: 3     vitamin C (ASCORBIC ACID) 1000 MG TABS, Take 1 tablet (1,000 mg) by mouth daily, Disp: 90 tablet, Rfl: 3     vitamin D3 (CHOLECALCIFEROL) 50 mcg (2000 units) tablet, Take 1 tablet (50 mcg) by mouth daily, Disp: 90 tablet, Rfl: 3    Current Facility-Administered Medications:      cyanocobalamin injection 1,000 mcg, 1,000 mcg, Intramuscular, Q30 Days, Crissy Madrigal PA-C, 1,000 mcg at 03/16/22 1017     cyanocobalamin injection 1,000 mcg, 1,000 mcg, Intramuscular, Q30 Days, Crissy Madrigal PA-C, 1,000 mcg at 07/26/22 1112    Review of Systems: A complete review of systems was obtained and was negative except for what was noted above.     Physical examination:    /71   Pulse 115   SpO2 96%     General Appearance: NAD    Skin: There are no rashes or other skin lesions.    Neurologic examination:    Mental status:  Patient is alert, attentive, and oriented x 3.  Language is coherent and fluent without aphasia.  Memory, comprehension and ability to follow commands were intact.       Cranial nerves:   Pupils were round and reacted to light.  Extraocular movements were full. There was no face, jaw, palate or tongue weakness or atrophy. Hearing was grossly intact.       Motor exam: No atrophy or fasciculations.   Manual muscle testing revealed the following MRC grade muscle power:   Right Left   Neck flexion 5    Neck extension: 5    Shoulder abduction:  5 5   Elbow extension: 5 5   Elbow flexion:  5 5   Wrist flexion:  5 5   Wrist extension:  5 5   APB 5 5   FDI 4 4   Hip flexion 5 5   Knee flexion 5 5   Knee extension 5 5   Dorsiflexion 5 5   Plantar flexion 5 5   Red indicates  worse when compared to last examination  Green indicates improved when compared to last examination    Complex motor skills: Mild bilateral postural hand tremor. Moderate ataxia with FNF.    Sensory exam: Vibration reduced in toes > ankles and fingers. Pin reduced below the knee and below the elbows    Gait: Unable to walk today    Deep tendon reflexes:   Right Left   Triceps 2 2   Biceps 1 2   Brachioradialis 2 2   Knee jerk 0 0   Ankle jerk 0 0     Neuropathy Assessments  Neurology Assessments 2023 2022 2021 2021 3/31/2021 2021 2020   RODS CIDP/MGUSP Score 28 31 34 22 33 32 29   Time for 'Up and Go' test- Seconds: Not able to walk 9.1 8.9 - 25.7 9.79 10.2      Strength: 2023 2022 2021 2021 3/31/2021 2021 2020   Right hand strength in K 23 25 16 24 25 18   Left hand strength in K 25 30 14 28 28 17     Immunotherapy 2022 2021 2021 3/31/2021 2021 2020 2020   Time since last IVIG (days): 2.5 weeks 1 day 1 week 27 days 1 week 12 5 days   Current treatment: IVIG 1 gm/kg q 3 weeks IVIG 1 gm/kg q 3 weeks IVIG 1 gm/kg q 4 weeks IVIG 1gm/kg q 4 weeks IVIG 1 gm/kg q 3 weeks IVIG 1gm/kg i7ewind IVIG 1 gm/kg q 3 weeks     Assessment:    Hilaria Bonner is a 34 year old woman with an acute onset (2020) 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  She also had well documented nutritional deficiencies (B1) at the time of neuropathy onset which may have contributed as well. Her clinical course has also been clouded by back pain/compression fractures and psychiatric overlay. From a neuropathy perspective it is very challenging to know her neuropathy disease activity status. Her  strength and gait are worse today, but there may be other reasons for that other than neuropathy. Certainly her neuropathy has caused some nerve injuries that  are likely irreversible at this point. Our objective over the next few months is to better understand if there is an active immunologic component. If there is no evidence for active immunologic disease then will taper IVIG and focus on supportive care.      Plan:      1. Nerve conduction studies: Although typically not a good way to follow neuropathy, in her case repeat studies to look for interval changes may provide important data.   2. Labs: Serum NfL. Will repeat B1 as well.   3. Immunotherapy: Continue IVIG 1 gm/kg q 4 weeks for now. If NCS are worse compared to the 1/13/21 study and NfL elevated then will take a cautious approach to IVIG tapering - and may need to escalate therapy. If those studies are improved (NCS) or normal (NfL) then will reduce IVIG at next visit and monitor for clinical change.   4. Pain: Continue follow up in the multidisciplinary pain clinic.   5. Nutritional: Continue B1 and B12 supplementation.   6. Gait and conditioning: She attends PT twice weekly  7. Follow up in 2-3 months. Sooner if needed. Pending NCS and NFL may either increase or decrease IVIG. IVIG-sparing medications are an option if we determine that she is IVIG dependant, but not desirable given recent sepsis and pneuromna. SCIG may be a better option.  ---  2/6/23: sNFL dated 1/26/23 elevated at 19.4 (N 0-1.87). Considering this and the interval worsening of the NCS result from last week I think we need to escalate IVIG back to 1 gm/kg q 3 weeks.  Will contact her to make the change. At next visit will take another look at NFL, but send to [facility] instead of [facility].

## 2023-05-17 ENCOUNTER — APPOINTMENT (OUTPATIENT)
Dept: UROLOGY | Facility: CLINIC | Age: 33
End: 2023-05-17
Payer: COMMERCIAL

## 2023-05-17 VITALS
RESPIRATION RATE: 17 BRPM | OXYGEN SATURATION: 95 % | SYSTOLIC BLOOD PRESSURE: 129 MMHG | WEIGHT: 144 LBS | HEART RATE: 74 BPM | BODY MASS INDEX: 26.5 KG/M2 | DIASTOLIC BLOOD PRESSURE: 86 MMHG | HEIGHT: 62 IN

## 2023-05-17 DIAGNOSIS — Z78.9 OTHER SPECIFIED HEALTH STATUS: ICD-10-CM

## 2023-05-17 DIAGNOSIS — R31.29 OTHER MICROSCOPIC HEMATURIA: ICD-10-CM

## 2023-05-17 DIAGNOSIS — Z83.3 FAMILY HISTORY OF DIABETES MELLITUS: ICD-10-CM

## 2023-05-17 PROCEDURE — 99204 OFFICE O/P NEW MOD 45 MIN: CPT

## 2023-05-17 NOTE — HISTORY OF PRESENT ILLNESS
[FreeTextEntry1] : patietn referred for 2 urine tests that showed blood with routine GYN exam \par  ( c section ) \par no associated fever, n.V \par chronic constipaton \par admits to avg water intake \par no LUTS , no INC , no liners\par no hx of renal stones or fam hx of stones\par no tobacco use or exposure \par menses irregualr - on OCP\par sexually active withno issues \par here for further eval :

## 2023-05-17 NOTE — ASSESSMENT
[FreeTextEntry1] : \par 1- check urine \par 2- check urine from GYN \par 3- if + blood seen -- can repeat due to age and low risk andif still +-- eval with jose eduardo andcysto

## 2023-05-18 LAB
APPEARANCE: CLEAR
BACTERIA: NEGATIVE /HPF
BILIRUBIN URINE: NEGATIVE
BLOOD URINE: NEGATIVE
CAST: 0 /LPF
COLOR: YELLOW
EPITHELIAL CELLS: 3 /HPF
GLUCOSE QUALITATIVE U: NEGATIVE MG/DL
KETONES URINE: NEGATIVE MG/DL
LEUKOCYTE ESTERASE URINE: NEGATIVE
MICROSCOPIC-UA: NORMAL
NITRITE URINE: NEGATIVE
PH URINE: 7
PROTEIN URINE: NEGATIVE MG/DL
RED BLOOD CELLS URINE: 1 /HPF
SPECIFIC GRAVITY URINE: 1.01
UROBILINOGEN URINE: 0.2 MG/DL
WHITE BLOOD CELLS URINE: 0 /HPF

## 2023-05-19 LAB — BACTERIA UR CULT: NORMAL

## 2023-09-07 ENCOUNTER — APPOINTMENT (OUTPATIENT)
Dept: ORTHOPEDIC SURGERY | Facility: CLINIC | Age: 33
End: 2023-09-07

## 2023-09-19 ENCOUNTER — APPOINTMENT (OUTPATIENT)
Dept: ORTHOPEDIC SURGERY | Facility: CLINIC | Age: 33
End: 2023-09-19
Payer: COMMERCIAL

## 2023-09-19 VITALS — WEIGHT: 145 LBS | HEIGHT: 62 IN | BODY MASS INDEX: 26.68 KG/M2

## 2023-09-19 PROCEDURE — 99203 OFFICE O/P NEW LOW 30 MIN: CPT

## 2023-09-19 PROCEDURE — 73110 X-RAY EXAM OF WRIST: CPT | Mod: LT

## 2023-09-29 ENCOUNTER — APPOINTMENT (OUTPATIENT)
Dept: MRI IMAGING | Facility: CLINIC | Age: 33
End: 2023-09-29
Payer: COMMERCIAL

## 2023-09-29 PROCEDURE — 73221 MRI JOINT UPR EXTREM W/O DYE: CPT | Mod: LT

## 2023-10-06 ENCOUNTER — APPOINTMENT (OUTPATIENT)
Dept: ORTHOPEDIC SURGERY | Facility: CLINIC | Age: 33
End: 2023-10-06

## 2023-10-12 ENCOUNTER — APPOINTMENT (OUTPATIENT)
Dept: ORTHOPEDIC SURGERY | Facility: CLINIC | Age: 33
End: 2023-10-12
Payer: COMMERCIAL

## 2023-10-12 DIAGNOSIS — M67.90 UNSPECIFIED DISORDER OF SYNOVIUM AND TENDON, UNSPECIFIED SITE: ICD-10-CM

## 2023-10-12 DIAGNOSIS — S63.599A OTHER SPECIFIED SPRAIN OF UNSPECIFIED WRIST, INITIAL ENCOUNTER: ICD-10-CM

## 2023-10-12 PROCEDURE — 99213 OFFICE O/P EST LOW 20 MIN: CPT

## 2024-11-27 NOTE — PHYSICAL EXAM
----- Message from Trey Morin MD sent at 11/26/2024  9:19 PM CST -----  Hepatic steatosis - no mass  CBC, CMP in 6 months  OV 6 months   [General Appearance - Well Developed] : well developed [General Appearance - Well Nourished] : well nourished [Normal Appearance] : normal appearance [Well Groomed] : well groomed [General Appearance - In No Acute Distress] : no acute distress [Edema] : no peripheral edema [Respiration, Rhythm And Depth] : normal respiratory rhythm and effort [Exaggerated Use Of Accessory Muscles For Inspiration] : no accessory muscle use [Abdomen Soft] : soft [Abdomen Tenderness] : non-tender [Costovertebral Angle Tenderness] : no ~M costovertebral angle tenderness [FreeTextEntry1] : pvr- 27 ml  [Normal Station and Gait] : the gait and station were normal for the patient's age [] : no rash [No Focal Deficits] : no focal deficits [Oriented To Time, Place, And Person] : oriented to person, place, and time [Affect] : the affect was normal [Mood] : the mood was normal [Not Anxious] : not anxious [No Palpable Adenopathy] : no palpable adenopathy